# Patient Record
Sex: FEMALE | Race: WHITE | Employment: OTHER | URBAN - METROPOLITAN AREA
[De-identification: names, ages, dates, MRNs, and addresses within clinical notes are randomized per-mention and may not be internally consistent; named-entity substitution may affect disease eponyms.]

---

## 2021-02-17 ENCOUNTER — HOSPITAL ENCOUNTER (OUTPATIENT)
Dept: PHYSICAL THERAPY | Age: 71
Discharge: HOME | End: 2021-02-17
Payer: MEDICARE

## 2021-02-17 PROCEDURE — 97162 PT EVAL MOD COMPLEX 30 MIN: CPT

## 2021-02-17 PROCEDURE — 97110 THERAPEUTIC EXERCISES: CPT

## 2021-02-17 NOTE — PROGRESS NOTES
PHYSICAL THERAPY - DAILY TREATMENT NOTE     Patient Name: Steffany Mckenzie        Date: 2021  : 1950   YES Patient  Verified  Visit #:   1     Insurance: Payor: Fede Keep / Plan: [place] MEDICARE PART A & B / Product Type: Medicare /      In time: 8682 Out time: 135   Total Treatment Time: 50     Medicare/BCBS Time Tracking (below)   Total Timed Codes (min):  20 1:1 Treatment Time:  50     TREATMENT AREA =  Pain in left thigh [M79.652]    SUBJECTIVE    Pain Level (on 0 to 10 scale):  1  / 10   Medication Changes/New allergies or changes in medical history, any new surgeries or procedures?     NO    If yes, update Summary List   Subjective Functional Status/Changes:  []  No changes reported     See eval /POC         OBJECTIVE  Modalities Rationale:     decrease pain to improve patient's ability to return to PLOF      min [] Estim, type/location:                                      []  att     []  unatt     []  w/US     []  w/ice    []  w/heat    min []  Mechanical Traction: type/lbs                   []  pro   []  sup   []  int   []  cont    []  before manual    []  after manual    min []  Ultrasound, settings/location:      min []  Iontophoresis w/ dexamethasone, location:                                               []  take home patch       []  in clinic    min []  Ice     []  Heat    location/position:     min []  Vasopneumatic Device, press/temp:     min []  Other:    [] Skin assessment post-treatment (if applicable):    []  intact    []  redness- no adverse reaction     []redness  adverse reaction:      10 min Therapeutic Exercise:  [x]  See flow sheet   Rationale:      increase ROM and increase strength to improve the patients ability to return to PLOF     10 min Manual Therapy: Technique:      [] S/DTM []IASTM []PROM [] Passive Stretching   []manual TPR    []Jt manipulation:Gr I [] II []  III [] IV[]  []REIL with manual OP  Treatment Area:     Rationale:      decrease pain, increase ROM, increase tissue extensibility and decrease trigger points to improve patient's ability to return to PLOF     min Neuromuscular Re-ed: [x]  See flow sheet   Rationale:      improve coordination, improve balance, increase proprioception and dec dizziness to improve the patients ability to return to PLOF        min Self Care:    Rationale:    increase ROM, increase strength and improve coordination to improve the patients ability to return to PLOF    Billed With/As:   [] TE   [] TA   [] Neuro   [] Self Care Patient Education: [x] Review HEP    [] Progressed/Changed HEP based on:   [] positioning   [] body mechanics   [] transfers   [] heat/ice application    [] other:        Other Objective/Functional Measures:    See eval/ POC     Post Treatment Pain Level (on 0 to 10) scale:   0  / 10     ASSESSMENT    X  See POC     PLAN    [x]  Upgrade activities as tolerated {YES) Continue plan of care   []  Discharge due to :    []  Other:      Therapist: Marcio Espitia PT    Date: 2/17/2021 Time: 1:48 PM     Future Appointments   Date Time Provider Leighton Flynn   2/19/2021  1:15 PM Cruz Decree, PTA [facility]   2/22/2021  2:45 PM Cruz Decree, PTA [facility]   2/26/2021 10:30 AM Cruz Decree, PTA [facility]   3/1/2021  9:30 AM Hank Clap, PT [facility]   3/3/2021  9:30 AM Hank Clap, PT [facility]   3/8/2021  9:30 AM Hank Clap, PT [facility]   3/10/2021  9:30 AM Hank Clap, PT [facility]   3/15/2021  9:30 AM Hank Clap, PT [facility]   3/17/2021 11:45 AM Hank Clap, PT [facility]

## 2021-02-17 NOTE — PROGRESS NOTES
[address] [facility] PHYSICAL THERAPY AT [facility] [address] [place], [address] - Phone: [phone]  Fax: [phone] / [facility]  Patient Name: Kristina Galdamez : 1950   Medical   Diagnosis: Pain in left thigh [M79.652] Treatment Diagnosis: Lx radic   Onset Date: ongoing     Referral Source: Ann Marie Traylor MD Start of Care [facility]): 2021   Prior Hospitalization: See medical history Provider #: [id number]   Prior Level of Function: Pain with ADLs    Comorbidities: Depression, arthritis, thyroid problem, asthma, gall baller surgery, toe surgery   Medications: Verified on Patient Summary List   The Plan of Care and following information is based on the information from the initial evaluation.   ================================================================  Assessment / hoyos information: Kristina Galdamez is a 79 y.o.  yo female with Dx of Pain in left thigh [M79.652]. She reports long history of LBP/ hip pain with recent worsening of symptoms. Pain is constant and across the LB and into the L buttock. Pain is worse in the morning and with activities to include sitting, transitioning from sit to stand, bending and L sidelying. On assessment, Kristina Galdamez demonstrates Lx ROM as follows: flex= 75%, ext= 50%, R ROT= 75%, L ROT= 50% with pain. LE strength and ROM are WNLs. SLR test is negative. Repeated movement tests suggests directional preference for extension. Palpation of pelvic landmarks indicates L anterior innominate rotation.   FOTO score= 54%.  ================================================================  Eval Complexity: History MEDIUM  Complexity : 1-2 comorbidities / personal factors will impact the outcome/ POC ;  Examination  HIGH Complexity : 4+ Standardized tests and measures addressing body structure, function, activity limitation and / or participation in recreation ; Presentation MEDIUM Complexity : Evolving with changing characteristics ; Decision Making MEDIUM Complexity : FOTO score of 26-74; Overall Complexity MEDIUM  Problem List: pain affecting function, decrease ROM, impaired gait/ balance, decrease ADL/ functional abilitiies and decrease activity tolerance   Treatment Plan may include any combination of the following: Therapeutic exercise, Therapeutic activities, Neuromuscular re-education, Physical agent/modality, Manual therapy and Patient education  Patient / Family readiness to learn indicated by: asking questions, trying to perform skills and interest  Persons(s) to be included in education: patient (P)  Barriers to Learning/Limitations: None  Measures taken, if barriers to learning:    Patient Goal (s): Eliminate pain   Patient self reported health status: good  Rehabilitation Potential: good   Short Term Goals: To be accomplished in  2  weeks:  1 Patient will report >= 25% improvement in symptoms with ADLs. 2 Patient will be educated in appropriate ROM, stabilization, strengthening exercises. 3 Patient will be educated in good posture/ body mechanics/ ergonomics for improved ADLs/work activity.  Long Term Goals: To be accomplished in  4-6  weeks:  1 Patient to report >= 70% improvement in symptoms with ADLs. 2 Patient will be independent with finalized HEP/ self maintenance. 3 Increase FOTO score >=  63% to indicate improved function with use of LS. 4 Patient to maintain neutral pelvis x 2 week duration. Frequency / Duration:   Patient to be seen  2  times per week for 4-6  weeks:  Patient / Caregiver education and instruction: self care, activity modification and exercises    Therapist Signature: Nessa Fermin PT Date: 5/33/5089   Certification Period: 2/17/21 to 5/13/21 Time: 1:49 PM   ===================================================================  I certify that the above Physical Therapy Services are being furnished while the patient is under my care. I agree with the treatment plan and certify that this therapy is necessary. Physician Signature:        Date:       Time:     Please sign and return to [facility] or you may fax the signed copy to [phone]. Thank you.

## 2021-02-19 ENCOUNTER — HOSPITAL ENCOUNTER (OUTPATIENT)
Dept: PHYSICAL THERAPY | Age: 71
Discharge: HOME | End: 2021-02-19
Payer: MEDICARE

## 2021-02-19 PROCEDURE — 97530 THERAPEUTIC ACTIVITIES: CPT

## 2021-02-19 PROCEDURE — 97110 THERAPEUTIC EXERCISES: CPT

## 2021-02-19 PROCEDURE — 97140 MANUAL THERAPY 1/> REGIONS: CPT

## 2021-02-19 NOTE — PROGRESS NOTES
PHYSICAL THERAPY - DAILY TREATMENT NOTE    Patient Name: Apurva Recio        Date: 2021  : 1950   YES Patient  Verified  Visit #:     Insurance: Payor: Shannan Members / Plan: [place] MEDICARE PART A & B / Product Type: Medicare /      In time: 1:15 Out time: 2:08    Total Treatment Time: 53     Medicare Time /BCBS Tracking (below)   Total Timed Codes (min):  53 1:1 Treatment Time:  53     TREATMENT AREA =  Pain in left thigh [M79.652]    SUBJECTIVE  Pain Level (on 0 to 10 scale):  0 / 10-   Medication Changes/New allergies or changes in medical history, any new surgeries or procedures? NO    If yes, update Summary List   Subjective Functional Status/Changes:  []  No changes reported     Pt reports she is trying not to bend. Pain when she first gets out of bed for a couple of steps. OBJECTIVE  Modalities Rationale:  Pt deferred     decrease edema, decrease inflammation, decrease pain and increase tissue extensibility to improve patient's ability to perform functional ADLs   min [] Estim, type/location:                                      []  att     []  unatt     []  w/US     []  w/ice    []  w/heat    min []  Mechanical Traction: type/lbs                   []  pro   []  sup   []  int   []  cont    []  before manual    []  after manual    min []  Ultrasound, settings/location:      min []  Iontophoresis w/ dexamethasone, location:                                               []  take home patch       []  in clinic    min []  Ice     []  Heat    location/position:     min []  Vasopneumatic Device, press/temp:     min []  Other:    [] Skin assessment post-treatment (if applicable):    []  intact    []  redness- no adverse reaction     []redness  adverse reaction:        28 min Therapeutic Exercise:  [x]  See flow sheet   Rationale:      increase ROM and increase strength to improve the patients ability to perform prolonged walking     12 min Manual Therapy:   The manual therapy interventions were performed at a separate and distinct time from the therapeutic activities interventions   Corrected right posterior innominate with MET ; prone STM/DTM to lumbar paraspinals   Rationale:      decrease pain, increase ROM, increase tissue extensibility and decrease trigger points to improve patient's ability to improve tissue mobility in prolonged walking, bending    13 min Therapeutic Activity: BET sit to stand body mechanics, log roll supine to sit , golfers bend education   Rationale:    increase ROM, improve coordination and increase proprioception to improve the patients ability to perform functional ADLs      With TA, TE min Patient Education:  YES  Reviewed HEP   []  Progressed/Changed HEP based on: Other Objective/Functional Measures: Add abdominal draw, BKFO, hip ABD/ADD OTB, post hip stretch  Prone trunk AROM ~ 75%    Post Treatment Pain Level (on 0 to 10) scale:   0  / 10     ASSESSMENT  Assessment/Changes in Function:   Pt reporting good pain relief with extension based exercise  Advanced core stabilization exercise . Pt verbalized good understanding of written copy. Corrected R post innominate     []  See Progress Note/Recertification   Patient will continue to benefit from skilled PT services to modify and progress therapeutic interventions, address functional mobility deficits, address ROM deficits, address strength deficits, analyze and address soft tissue restrictions and instruct in home and community integration to attain remaining goals.    Progress toward goals / Updated goals:    Functional improvements: good pain relief with current exercise program     PLAN  []  Upgrade activities as tolerated YES Continue plan of care   []  Discharge due to :    []  Other:      Therapist: Vanessa Griffith PTA    Date: 2/19/2021 Time: 2:08 PM     Future Appointments   Date Time Provider Leighton Flynn   2/22/2021  2:45 PM Felicia Alvarado PTA [facility]   2/26/2021 10:30 AM Estefany Canada, PTA [facility]   3/1/2021  9:30 AM Aileen Karma, PT [facility]   3/3/2021  9:30 AM Aileen Karma, PT [facility]   3/8/2021  9:30 AM Aileen Karma, PT [facility]   3/10/2021  9:30 AM Aileen Karma, PT [facility]   3/15/2021  9:30 AM Aileen Karma, PT [facility]   3/17/2021 11:45 AM Aileen Karma, PT [facility]

## 2021-02-22 ENCOUNTER — HOSPITAL ENCOUNTER (OUTPATIENT)
Dept: PHYSICAL THERAPY | Age: 71
Discharge: HOME | End: 2021-02-22
Payer: MEDICARE

## 2021-02-22 PROCEDURE — 97530 THERAPEUTIC ACTIVITIES: CPT

## 2021-02-22 PROCEDURE — 97140 MANUAL THERAPY 1/> REGIONS: CPT

## 2021-02-22 PROCEDURE — 97110 THERAPEUTIC EXERCISES: CPT

## 2021-02-22 NOTE — PROGRESS NOTES
PHYSICAL THERAPY - DAILY TREATMENT NOTE    Patient Name: Cindi Arora        Date: 2021  : 1950   YES Patient  Verified  Visit #:  3   of   12  Insurance: Payor: May Gaitan / Plan: [place] MEDICARE PART A & B / Product Type: Medicare /      In time: 2:55  Out time: 3:43    Total Treatment Time: 48     Medicare Time /BCBS Tracking (below)   Total Timed Codes (min): 38 1:1 Treatment Time:  38     TREATMENT AREA =  Pain in left thigh [M79.652]    SUBJECTIVE  Pain Level (on 0 to 10 scale):  0 / 50-[id number] with certain movements and right side bend, RR   Medication Changes/New allergies or changes in medical history, any new surgeries or procedures? NO    If yes, update Summary List   Subjective Functional Status/Changes:  []  No changes reported     Pt reports sore the next day. I walked today and I had to stop because it was uncomfortable.      OBJECTIVE  Modalities Rationale:  Pt deferred     decrease edema, decrease inflammation, decrease pain and increase tissue extensibility to improve patient's ability to perform functional ADLs   min [] Estim, type/location:                                      []  att     []  unatt     []  w/US     []  w/ice    []  w/heat    min []  Mechanical Traction: type/lbs                   []  pro   []  sup   []  int   []  cont    []  before manual    []  after manual    min []  Ultrasound, settings/location:      min []  Iontophoresis w/ dexamethasone, location:                                               []  take home patch       []  in clinic   10 min [x]  Ice     []  Heat    location/position: Prone low back    min []  Vasopneumatic Device, press/temp:     min []  Other:    [x] Skin assessment post-treatment (if applicable):    [x]  intact    []  redness- no adverse reaction     []redness  adverse reaction:        16 min Therapeutic Exercise:  [x]  See flow sheet   Rationale:      increase ROM and increase strength to improve the patients ability to perform prolonged walking     13 min Manual Therapy: The manual therapy interventions were performed at a separate and distinct time from the therapeutic activities interventions   Corrected right posterior innominate with MET ; prone STM/DTM to lumbar paraspinals   Rationale:      decrease pain, increase ROM, increase tissue extensibility and decrease trigger points to improve patient's ability to improve tissue mobility in prolonged walking, bending    9 min Therapeutic Activity: BET sit to stand body mechanics, golfers bend,  supine to sit    Rationale:    increase ROM, improve coordination and increase proprioception to improve the patients ability to perform functional ADLs      With TA, TE min Patient Education:  YES  Reviewed HEP   []  Progressed/Changed HEP based on: Other Objective/Functional Measures:  Trunk AROM: FF: 90% , ext: 50%- pain left LB, RSB: 80%-pull left LB   LSB: 50%-pull L LB, RR: 90%; LR: 85% -pull L LB    Add march with core in supine, SB 1 10x 5 second hold   Post Treatment Pain Level (on 0 to 10) scale:   0  / 10     ASSESSMENT  Assessment/Changes in Function:   SI hypomobility noted on R posterior innominate. Corrected with MET; pt instruction in self correction technique. Review body mechanics in sitting, bending, reaching. Pt reporting good sx relief with extension based exercise. Modified post hip stretch on left  With push versus pull secondary to pinching in left groin region. []  See Progress Note/Recertification   Patient will continue to benefit from skilled PT services to modify and progress therapeutic interventions, address functional mobility deficits, address ROM deficits, address strength deficits, analyze and address soft tissue restrictions and instruct in home and community integration to attain remaining goals.    Progress toward goals / Updated goals:    Functional improvements: good pain relief with current exercise program     PLAN  []  Upgrade activities as tolerated YES Continue plan of care   []  Discharge due to :    []  Other:      Therapist: Rica Duran PTA    Date: 2/22/2021 Time: 3:43  PM     Future Appointments   Date Time Provider Leighton Flynn   2/26/2021 10:30 AM Mae Guthrie PTA [facility]   3/1/2021  9:30 AM De Floor, PT [facility]   3/3/2021  9:30 AM De Floor, PT [facility]   3/8/2021  9:30 AM De Floor, PT [facility]   3/10/2021  9:30 AM De Floor, PT [facility]   3/15/2021  9:30 AM De Floor, PT [facility]   3/17/2021 11:45 AM De Floor, PT [facility]

## 2021-02-26 ENCOUNTER — HOSPITAL ENCOUNTER (OUTPATIENT)
Dept: PHYSICAL THERAPY | Age: 71
Discharge: HOME | End: 2021-02-26
Payer: MEDICARE

## 2021-02-26 PROCEDURE — 97140 MANUAL THERAPY 1/> REGIONS: CPT

## 2021-02-26 PROCEDURE — 97110 THERAPEUTIC EXERCISES: CPT

## 2021-02-26 NOTE — PROGRESS NOTES
PHYSICAL THERAPY - DAILY TREATMENT NOTE    Patient Name: Britt Plolock        Date: 2021  : 1950   YES Patient  Verified  Visit #:   Insurance: Payor: Gurdeep Gomez / Plan: [place] MEDICARE PART A & B / Product Type: Medicare /      In time: 10:35 Out time: 11:23    Total Treatment Time: 64     Medicare Time /BCBS Tracking (below)   Total Timed Codes (min): 54 1:1 Treatment Time:  39     TREATMENT AREA =  Pain in left thigh [M79.652]    SUBJECTIVE  Pain Level (on 0 to 10 scale):  0 / 49-[id number] with certain movements and right side bend, RR   Medication Changes/New allergies or changes in medical history, any new surgeries or procedures? yes    If yes, update Summary List   Subjective Functional Status/Changes:  []  No changes reported     Pt aQ ortho . Nothing wrong bone wise.  Follow up in 3 weeks     OBJECTIVE  Modalities Rationale:       decrease edema, decrease inflammation, decrease pain and increase tissue extensibility to improve patient's ability to perform functional ADLs   min [] Estim, type/location:                                      []  att     []  unatt     []  w/US     []  w/ice    []  w/heat    min []  Mechanical Traction: type/lbs                   []  pro   []  sup   []  int   []  cont    []  before manual    []  after manual    min []  Ultrasound, settings/location:      min []  Iontophoresis w/ dexamethasone, location:                                               []  take home patch       []  in clinic   10 min []  Ice     [x]  Heat    location/position: Prone low back over 1 pillow    min []  Vasopneumatic Device, press/temp:     min []  Other:    [x] Skin assessment post-treatment (if applicable):    [x]  intact    []  redness- no adverse reaction     []redness  adverse reaction:        40 min Therapeutic Exercise:  [x]  See flow sheet   Rationale:      increase ROM and increase strength to improve the patients ability to perform prolonged walking     12 min Manual Therapy: The manual therapy interventions were performed at a separate and distinct time from the therapeutic activities interventions   Corrected right anterior innominate & Right inflare with MET ; prone STM/DTM to lumbar paraspinals and glut medius   Rationale:      decrease pain, increase ROM, increase tissue extensibility and decrease trigger points to improve patient's ability to improve tissue mobility in prolonged walking, bending    2 min Therapeutic Activity: Body mechanics in vacuuming and bending   Rationale:    increase ROM, improve coordination and increase proprioception to improve the patients ability to perform functional ADLs      With TA, TE min Patient Education:  YES  Reviewed HEP   []  Progressed/Changed HEP based on: Other Objective/Functional Measures:  Prone AROM trunk extension ~ 30%  Add supine Bridge   Post Treatment Pain Level (on 0 to 10) scale:   0  / 10     ASSESSMENT  Assessment/Changes in Function:   Pt now able to perform posterior hip stretch  With pull bilaterally-indicating improving hip ROM  Pain centralizing to low back   Add MH in prone over 1 pillow     []  See Progress Note/Recertification   Patient will continue to benefit from skilled PT services to modify and progress therapeutic interventions, address functional mobility deficits, address ROM deficits, address strength deficits, analyze and address soft tissue restrictions and instruct in home and community integration to attain remaining goals.    Progress toward goals / Updated goals:    Functional improvements: improving tolerance to ex, hip ROM     PLAN  []  Upgrade activities as tolerated YES Continue plan of care   []  Discharge due to :    []  Other:      Therapist: Emmy Plascencia PTA    Date: 2/26/2021 Time:   11:23      Future Appointments   Date Time Provider Leighton Flynn   3/1/2021  9:30 AM Paul Brooks PT [facility]   3/3/2021  8:45 AM Paul Brooks PT [facility]   3/8/2021  9:30 AM Sav Foote, PT [facility]   3/10/2021  9:30 AM Sav Foote, PT [facility]   3/15/2021  9:30 AM Sav Foote, PT [facility]   3/17/2021 11:45 AM Sav Foote, PT [facility]

## 2021-03-01 ENCOUNTER — HOSPITAL ENCOUNTER (OUTPATIENT)
Dept: PHYSICAL THERAPY | Age: 71
Discharge: HOME | End: 2021-03-01
Payer: MEDICARE

## 2021-03-01 PROCEDURE — 97140 MANUAL THERAPY 1/> REGIONS: CPT

## 2021-03-01 PROCEDURE — 97110 THERAPEUTIC EXERCISES: CPT

## 2021-03-01 NOTE — PROGRESS NOTES
PHYSICAL THERAPY - DAILY TREATMENT NOTE     Patient Name: Rueben Severin        Date: 3/1/2021  : 1950   YES Patient  Verified  Visit #:     Insurance: Payor: Laylahang Baugh / Plan: [place] MED[place] PART [place] & B / Product Type: Medicare /      In time: 692 Out time: 9635   Total Treatment Time: 50     Medicare/BCBS Time Tracking (below)   Total Timed Codes (min):  40 1:1 Treatment Time:  40     TREATMENT AREA =  Pain in left thigh [M79.652]    SUBJECTIVE    Pain Level (on 0 to 10 scale):    / 10 (2/10 with movement, 6/10 getting out of bed)   Medication Changes/New allergies or changes in medical history, any new surgeries or procedures?     NO    If yes, update Summary List   Subjective Functional Status/Changes:  []  No changes reported   This is the first time I've have pain, but cortisone dosage down to 2 pills    Pain right at L SIJ region           OBJECTIVE  Modalities Rationale:     decrease pain and increase tissue extensibility to improve patient's ability to relax      min [] Estim, type/location:                                      []  att     []  unatt     []  w/US     []  w/ice    []  w/heat    min []  Mechanical Traction: type/lbs                   []  pro   []  sup   []  int   []  cont    []  before manual    []  after manual    min []  Ultrasound, settings/location:      min []  Iontophoresis w/ dexamethasone, location:                                               []  take home patch       []  in clinic   10 min []  Ice     [x]  Heat    location/position:     min []  Vasopneumatic Device, press/temp:     min []  Other:    [x] Skin assessment post-treatment (if applicable):    [x]  intact    []  redness- no adverse reaction     []redness  adverse reaction:      15 min Manual Therapy: Technique:      [x] S/DTM []IASTM []PROM [] Passive Stretching   [x]manual TPR  [] SOR [] man traction  []Jt manipulation:Gr I [] II []  III [] IV[]   [] OP with REIL    [x] MET  Treatment Area:LB/ SIJ *manual therapy interventions were performed at a separate and distinct time from   the therapeutic activities interventions. Rationale:      decrease pain, increase ROM, increase tissue extensibility, decrease trigger points and increase postural awareness to improve patient's ability to perform ADLs with less pain    25 min Therapeutic Exercise:  [x]  See flow sheet   Rationale:      increase ROM, increase strength and improve symmetry to improve the patients ability to perform ADLs         Billed With/As:   [x] TE   [] TA   [] Neuro   [] Self Care Patient Education: [x] Review HEP    [] Progressed/Changed HEP based on:   [] positioning   [] body mechanics   [] transfers   [] heat/ice application    [] other:        Other Objective/Functional Measures:    L ant/ R post innominate   Post Treatment Pain Level (on 0 to 10) scale:   0-1  / 10     ASSESSMENT  Assessment/Changes in Function:      Functional improvement:  *progressing  Core stability  Functional limitation:  SIJ pain with activity           Patient will continue to benefit from skilled PT services to modify and progress therapeutic interventions, address functional mobility deficits, address ROM deficits, address strength deficits, analyze and address soft tissue restrictions, analyze and cue movement patterns and assess and modify postural abnormalities to attain remaining goals.    Progress toward goals / Updated goals:    Slow Progress to    [] STG    [x] LTG  4 as shown by pelvic assymetry         []  See Progress Note/Recertification    PLAN    [x]  Upgrade activities as tolerated {YES) Continue plan of care   []  Discharge due to :    []  Other:      Therapist: Joao Maria PT    Date: 3/1/2021 Time: 9:36 AM     Future Appointments   Date Time Provider Leightno Flynn   3/3/2021  8:45 AM Sandra Crowley PT [facility]   3/8/2021  9:30 AM Sandra Crowley PT [facility]   3/10/2021  9:30 AM Sandra Crowley PT [facility]   3/15/2021 9:30 AM Gunnar Bell, PT [facility]   3/17/2021 11:45 AM Gunnar Bell PT [facility]

## 2021-03-03 ENCOUNTER — HOSPITAL ENCOUNTER (OUTPATIENT)
Dept: PHYSICAL THERAPY | Age: 71
Discharge: HOME | End: 2021-03-03
Payer: MEDICARE

## 2021-03-03 PROCEDURE — 97110 THERAPEUTIC EXERCISES: CPT

## 2021-03-03 PROCEDURE — 97140 MANUAL THERAPY 1/> REGIONS: CPT

## 2021-03-03 NOTE — PROGRESS NOTES
PHYSICAL THERAPY - DAILY TREATMENT NOTE     Patient Name: Greg Stone        Date: 3/3/2021  : 1950   YES Patient  Verified  Visit #:     Insurance: Payor: Elvia Gonzalez / Plan: [place] MEDICARE PART A & B / Product Type: Medicare /      In time: 005 Out time: 940   Total Treatment Time: 55     Medicare/BCBS Time Tracking (below)   Total Timed Codes (min):  45 1:1 Treatment Time:  45     TREATMENT AREA =  Pain in left thigh [M79.972]    SUBJECTIVE    Pain Level (on 0 to 10 scale):  4  / 10   Medication Changes/New allergies or changes in medical history, any new surgeries or procedures? NO    If yes, update Summary List   Subjective Functional Status/Changes:  []  No changes reported   Always notice it getting out of car, getting out of a chair.   Worse in the morning           OBJECTIVE  Modalities Rationale:     decrease pain and increase tissue extensibility to improve patient's ability to perform ADLs with less pain      min [] Estim, type/location:                                      []  att     []  unatt     []  w/US     []  w/ice    []  w/heat    min []  Mechanical Traction: type/lbs                   []  pro   []  sup   []  int   []  cont    []  before manual    []  after manual    min []  Ultrasound, settings/location:      min []  Iontophoresis w/ dexamethasone, location:                                               []  take home patch       []  in clinic   10 min []  Ice     [x]  Heat    location/position:     min []  Vasopneumatic Device, press/temp:     min []  Other:    [x] Skin assessment post-treatment (if applicable):    [x]  intact    [x]  redness- no adverse reaction     []redness  adverse reaction:      20 min Manual Therapy: Technique:      [x] S/DTM []IASTM []PROM [] Passive Stretching   [x]manual TPR  [] SOR [] man traction  []Jt manipulation:Gr I [] II []  III [] IV[]   [] OP with REIL    [x] MET  Treatment Area: pelvis/ LB       *manual therapy interventions were performed at a separate and distinct time from   the therapeutic activities interventions. Rationale:      decrease pain, increase ROM, increase tissue extensibility and decrease trigger points to improve patient's ability to perform ADLs    25 min Therapeutic Exercise:  [x]  See flow sheet   Rationale:      increase ROM, increase strength and inc stability to improve the patients ability to perform ADLs with less pain       Billed With/As:   [] TE   [] TA   [] Neuro   [] Self Care Patient Education: [x] Review HEP    [] Progressed/Changed HEP based on:   [] positioning   [] body mechanics   [] transfers   [] heat/ice application    [] other:        Other Objective/Functional Measures:    Tender L SIJ  Mild L ant innominate- corrected with MET   Post Treatment Pain Level (on 0 to 10) scale:   0  / 10     ASSESSMENT  Assessment/Changes in Function:      Min change           Patient will continue to benefit from skilled PT services to modify and progress therapeutic interventions, address functional mobility deficits, address ROM deficits, address strength deficits, analyze and address soft tissue restrictions, analyze and cue movement patterns and assess and modify postural abnormalities to attain remaining goals.    Progress toward goals / Updated goals:    Slow Progress to    [x] STG    [x] LTG  1 as shown by NPI         []  See Progress Note/Recertification    PLAN    [x]  Upgrade activities as tolerated {YES) Continue plan of care   []  Discharge due to :    []  Other:      Therapist: Anna Oppenheim, PT    Date: 3/3/2021 Time: 8:46 AM     Future Appointments   Date Time Provider Leighton Flynn   3/8/2021  9:30 AM Alexa Montero PT [facility]   3/10/2021  9:30 AM Alexa Montero PT [facility]   3/15/2021  9:30 AM Kelly Morrison PTA [facility]   3/17/2021 11:45 AM Alexa Montero PT [facility]

## 2021-03-08 ENCOUNTER — HOSPITAL ENCOUNTER (OUTPATIENT)
Dept: PHYSICAL THERAPY | Age: 71
Discharge: HOME | End: 2021-03-08
Payer: MEDICARE

## 2021-03-08 PROCEDURE — 97140 MANUAL THERAPY 1/> REGIONS: CPT

## 2021-03-08 PROCEDURE — 97110 THERAPEUTIC EXERCISES: CPT

## 2021-03-08 NOTE — PROGRESS NOTES
PHYSICAL THERAPY - DAILY TREATMENT NOTE     Patient Name: Nneka Osorio        Date: 3/8/2021  : 1950   YES Patient  Verified  Visit #:     Insurance: Payor: Brigitte Hazard / Plan: [place] MEDICARE PART A & B / Product Type: Medicare /      In time: 930 Out time: 1020   Total Treatment Time: 50     Medicare/BCBS Time Tracking (below)   Total Timed Codes (min):  40 1:1 Treatment Time:  40     TREATMENT AREA =  Pain in left thigh [M79.372]    SUBJECTIVE    Pain Level (on 0 to 10 scale):  0  / 10   Medication Changes/New allergies or changes in medical history, any new surgeries or procedures? NO    If yes, update Summary List   Subjective Functional Status/Changes:  []  No changes reported   Stiff after last visit,     More LBP than hip pain. Getting better slowly.   Worse inmorning           OBJECTIVE  Modalities Rationale:     decrease pain and increase tissue extensibility to improve patient's ability to relax      min [] Estim, type/location:                                      []  att     []  unatt     []  w/US     []  w/ice    []  w/heat    min []  Mechanical Traction: type/lbs                   []  pro   []  sup   []  int   []  cont    []  before manual    []  after manual    min []  Ultrasound, settings/location:      min []  Iontophoresis w/ dexamethasone, location:                                               []  take home patch       []  in clinic   10 min []  Ice     [x]  Heat    location/position:     min []  Vasopneumatic Device, press/temp:     min []  Other:    [x] Skin assessment post-treatment (if applicable):    [x]  intact    [x]  redness- no adverse reaction     []redness  adverse reaction:      10 min Manual Therapy: Technique:      [x] S/DTM []IASTM []PROM [] Passive Stretching   [x]manual TPR  [] SOR [] man traction  []Jt manipulation:Gr I [] II []  III [] IV[]   [] OP with REIL    []   Treatment Area:  LB      *manual therapy interventions were performed at a separate and distinct time from   the therapeutic activities interventions. Rationale:      decrease pain, increase ROM, increase tissue extensibility and decrease trigger points to improve patient's ability to perform ADLs with less pain    30 min Therapeutic Exercise:  [x]  See flow sheet   Rationale:      increase ROM, increase strength and dec neural compromise with less pain to improve the patients ability to perform ADLs with less pain       Billed With/As:   [] TE   [] TA   [] Neuro   [] Self Care Patient Education: [x] Review HEP    [] Progressed/Changed HEP based on:   [] positioning   [] body mechanics   [] transfers   [] heat/ice application    [] other:        Other Objective/Functional Measures:    Pelvic landmarks =  Progressed dead bug, added PKE   Post Treatment Pain Level (on 0 to 10) scale:   0  / 10     ASSESSMENT  Assessment/Changes in Function:      Functional improvement:  centralization of Sx  Functional limitation:  morning pain           Patient will continue to benefit from skilled PT services to modify and progress therapeutic interventions, address functional mobility deficits, address ROM deficits, address strength deficits, analyze and address soft tissue restrictions and analyze and cue movement patterns to attain remaining goals.    Progress toward goals / Updated goals:    Fair Progress to    [] STG    [x] LTG  1 as shown by pt report         []  See Progress Note/Recertification    PLAN    [x]  Upgrade activities as tolerated {YES) Continue plan of care   []  Discharge due to :    []  Other:      Therapist: Kenji Morelos PT    Date: 3/8/2021 Time: 9:32 AM     Future Appointments   Date Time Provider Leighton Flynn   3/10/2021  9:30 AM Srikanth Sylvester PT [facility]   3/15/2021  9:30 AM Delmy Uriaselor, PTA [facility]   3/17/2021 11:45 AM Srikanth Sylvester PT [facility]

## 2021-03-10 ENCOUNTER — HOSPITAL ENCOUNTER (OUTPATIENT)
Dept: PHYSICAL THERAPY | Age: 71
Discharge: HOME | End: 2021-03-10
Payer: MEDICARE

## 2021-03-10 PROCEDURE — 97140 MANUAL THERAPY 1/> REGIONS: CPT

## 2021-03-10 PROCEDURE — 97110 THERAPEUTIC EXERCISES: CPT

## 2021-03-10 NOTE — PROGRESS NOTES
PHYSICAL THERAPY - DAILY TREATMENT NOTE     Patient Name: Tamanna Ferrell        Date: 3/10/2021  : 1950   YES Patient  Verified  Visit #:     Insurance: Payor: [facility] / Plan: [place] MEDICARE PART A & B / Product Type: Medicare /      In time: 930 Out time: 1020   Total Treatment Time: 50     Medicare/BCBS Time Tracking (below)   Total Timed Codes (min):  40 1:1 Treatment Time:  40     TREATMENT AREA =  Pain in left thigh [M79.702]    SUBJECTIVE    Pain Level (on 0 to 10 scale):  1-2  / 10   Medication Changes/New allergies or changes in medical history, any new surgeries or procedures? NO    If yes, update Summary List   Subjective Functional Status/Changes:  []  No changes reported   I did something to my back last night in bed.   L LBP           OBJECTIVE  Modalities Rationale:     decrease inflammation and decrease pain to improve patient's ability to perform ADLs with less pain      min [] Estim, type/location:                                      []  att     []  unatt     []  w/US     []  w/ice    []  w/heat    min []  Mechanical Traction: type/lbs                   []  pro   []  sup   []  int   []  cont    []  before manual    []  after manual    min []  Ultrasound, settings/location:      min []  Iontophoresis w/ dexamethasone, location:                                               []  take home patch       []  in clinic   10 min [x]  Ice     []  Heat    location/position:     min []  Vasopneumatic Device, press/temp:     min []  Other:    [x] Skin assessment post-treatment (if applicable):    [x]  intact    []  redness- no adverse reaction     []redness  adverse reaction:      15 min Manual Therapy: Technique:      [x] S/DTM []IASTM []PROM [] Passive Stretching   [x]manual TPR  [] SOR [] man traction  []Jt manipulation:Gr I [] II []  III [] IV[]   [] OP with REIL    [x] MET  Treatment Area:  LB/ pelvis      *manual therapy interventions were performed at a separate and distinct time from   the therapeutic activities interventions. Rationale:      decrease pain, increase ROM, increase tissue extensibility, decrease trigger points and increase postural awareness to improve patient's ability to perform ADLs with less pain    25 min Therapeutic Exercise:  [x]  See flow sheet   Rationale:      increase ROM, increase strength and inc stability to improve the patients ability to perform ADLs with less pain      x min Self Care: Self correction MET instruction   Rationale:    improve symmetry to improve the patients ability to perform ADLs with less pain    Billed With/As:   [] TE   [] TA   [] Neuro   [] Self Care Patient Education: [x] Review HEP    [] Progressed/Changed HEP based on:   [] positioning   [] body mechanics   [] transfers   [] heat/ice application    [] other:        Other Objective/Functional Measures:    L ant/ R post innominate--corrected with MET   Post Treatment Pain Level (on 0 to 10) scale:   1  / 10     ASSESSMENT  Assessment/Changes in Function:      Progressing core stability- able to advance there ex           Patient will continue to benefit from skilled PT services to modify and progress therapeutic interventions, address functional mobility deficits, address ROM deficits, address strength deficits, analyze and address soft tissue restrictions and analyze and cue movement patterns to attain remaining goals.    Progress toward goals / Updated goals:    Able to correct symmetry of pelvis- difficulty maintaining         []  See Progress Note/Recertification    PLAN    [x]  Upgrade activities as tolerated {YES) Continue plan of care   []  Discharge due to :    []  Other:      Therapist: Jeannette Perez PT    Date: 3/10/2021 Time: 9:32 AM     Future Appointments   Date Time Provider Leighton Flynn   3/15/2021  9:30 AM Renny Odom PTA [facility]   3/17/2021 11:45 AM Emmy Paniagua, PT [facility]

## 2021-03-15 ENCOUNTER — HOSPITAL ENCOUNTER (OUTPATIENT)
Dept: PHYSICAL THERAPY | Age: 71
Discharge: HOME | End: 2021-03-15
Payer: MEDICARE

## 2021-03-15 PROCEDURE — 97140 MANUAL THERAPY 1/> REGIONS: CPT

## 2021-03-15 PROCEDURE — 97110 THERAPEUTIC EXERCISES: CPT

## 2021-03-15 NOTE — PROGRESS NOTES
PHYSICAL THERAPY - DAILY TREATMENT NOTE     Patient Name: Steffany Mckenzie        Date: 3/15/2021  : 1950   YES Patient  Verified  Visit #:     Insurance: Payor: Fede Keep / Plan: [place] MEDICARE PART A & B / Product Type: Medicare /      In time: 9:35 Out time: 10:30   Total Treatment Time: 55     Medicare/BCBS Time Tracking (below)   Total Timed Codes (min):  45 1:1 Treatment Time:  45     TREATMENT AREA =  Pain in left thigh [M79.122]    SUBJECTIVE    Pain Level (on 0 to 10 scale):  1-2 / 10   Medication Changes/New allergies or changes in medical history, any new surgeries or procedures? NO    If yes, update Summary List   Subjective Functional Status/Changes:  []  No changes reported     Functional Limitations: LB pain in the morning, discomfort throughout the day. T/S pain with prolonged standing (ie.  Folding clothes)   Functional Improvements: N/A           OBJECTIVE  Modalities Rationale:     decrease inflammation and decrease pain to improve patient's ability to perform ADLs with less pain      min [] Estim, type/location:                                      []  att     []  unatt     []  w/US     []  w/ice    []  w/heat    min []  Mechanical Traction: type/lbs                   []  pro   []  sup   []  int   []  cont    []  before manual    []  after manual    min []  Ultrasound, settings/location:      min []  Iontophoresis w/ dexamethasone, location:                                               []  take home patch       []  in clinic   10 min []  Ice     [x]  Heat    location/position: To L/S and T/S in prone    min []  Vasopneumatic Device, press/temp:     min []  Other:    [x] Skin assessment post-treatment (if applicable):    [x]  intact    []  redness- no adverse reaction     []redness  adverse reaction:      15 min Manual Therapy: Technique:      [x] S/DTM []IASTM []PROM [] Passive Stretching   [x]manual TPR  [] SOR [] man traction  Treatment Area: T/S and L/S  *manual therapy interventions were performed at a separate and distinct time from  the therapeutic activities interventions. Rationale:      decrease pain, increase ROM, increase tissue extensibility, decrease trigger points and increase postural awareness to improve patient's ability to perform ADLs with less pain    30 min Therapeutic Exercise:  [x]  See flow sheet   Rationale:      increase ROM, increase strength and inc stability to improve the patients ability to perform ADLs with less pain    Billed With/As:   [] TE   [] TA   [] Neuro   [] Self Care Patient Education: [x] Review HEP    [] Progressed/Changed HEP based on:   [] positioning   [] body mechanics   [] transfers   [] heat/ice application    [] other:        Other Objective/Functional Measures:    No pelvic obliquity noted today. Maintained throughout Tx. Post Treatment Pain Level (on 0 to 10) scale:   0  / 10     ASSESSMENT  Assessment/Changes in Function:      Progressing core stability but L SIJt pain remains unchanged. Patient will continue to benefit from skilled PT services to modify and progress therapeutic interventions, address functional mobility deficits, address ROM deficits, address strength deficits, analyze and address soft tissue restrictions and analyze and cue movement patterns to attain remaining goals. Progress toward goals / Updated goals:    Slow to min progress towards goals.  PN needed NV.          []  See Progress Note/Recertification    PLAN    [x]  Upgrade activities as tolerated {YES) Continue plan of care   []  Discharge due to :    []  Other:      Therapist: Efren Treadwell PTA    Date: 3/15/2021 Time: 9:32 AM     Future Appointments   Date Time Provider Leighton Flynn   3/15/2021  9:30 AM Christal Herrmann PTA [facility]   3/17/2021 11:45 AM Gunnar Bell PT [facility]

## 2021-03-17 ENCOUNTER — HOSPITAL ENCOUNTER (OUTPATIENT)
Dept: PHYSICAL THERAPY | Age: 71
Discharge: HOME | End: 2021-03-17
Payer: MEDICARE

## 2021-03-17 PROCEDURE — 97110 THERAPEUTIC EXERCISES: CPT

## 2021-03-17 PROCEDURE — 97140 MANUAL THERAPY 1/> REGIONS: CPT

## 2021-03-17 NOTE — PROGRESS NOTES
[address] [facility] PHYSICAL THERAPY AT [address] [place] [address], [address] [place] [address], [address], [address] [place] [address] - Phone: [phone]  Fax: [phone]  Li          Patient Name: Gini Bolton : 1950   Treatment/Medical Diagnosis: Pain in left thigh [M79.652]   Onset Date: ongoing    Referral Source: Nohemi Jean MD [facility]): 21   Prior Hospitalization: See Medical History Provider #: [id number]   Prior Level of Function: Pain with ADLs   Comorbidities: Depression, arthritis, thyroid problem, asthma, gall baller surgery, toe surgery   Medications: Verified on Patient Summary List   Visits from [facility]: 10 Missed Visits: -     Previous Goals:  1 Patient to report >= 70% improvement in symptoms with ADLs. 2 Patient will be independent with finalized HEP/ self maintenance. 3 Increase FOTO score >=  63% to indicate improved function with use of LS. 4 Patient to maintain neutral pelvis x 2 week duration. Prior Level/Current Level:  1) Prior Level: na   Current Level: 25%   Goal Met? progressing  2) Prior Level: na   Current Level: indep with current HEP   Goal Met? Partially met  3) Prior Level: 54%   Current Level: 63%   Goal Met? yes  4) Prior Level: L anterior innominate   Current Level: neutral pelvis last few visits (1 week)   Goal Met? Partially met    Key Functional Changes/Progress: Patient reports 25% overall improvement. She reports decreased frequency and severity of episodes of pain. She reports/ demonstrates improved ability to transition sit to stand and bending.     Problem List: pain affecting function, decrease ROM, decrease strength, decrease ADL/ functional abilitiies and decrease activity tolerance   Treatment Plan may include any combination of the following: Therapeutic exercise, Therapeutic activities, Neuromuscular re-education, Physical agent/modality, Manual therapy and Patient education  Patient Goal(s) has been updated and includes:      Goals for this certification period include and are to be achieved in   4  weeks:  Continue LTGs as stated above with following changes:  3 Increase FOTO score >=  66% to indicate improved function with use of LS. Frequency / Duration:   Patient to be seen   2   times per week for   4    weeks:    Assessments/Recommendations: continue PT per current POC  If you have any questions/comments please contact us directly at [phone]. Thank you for allowing us to assist in the care of your patient. Therapist Signature: Fox Cazares PT Date: 3/48/0116   Certification Period:  Reporting Period: 3/17/21 to 6/14/21 2/17/21 to 3/17/21 Time: 7:40 AM   NOTE TO PHYSICIAN:  PLEASE COMPLETE THE ORDERS BELOW AND FAX TO   [facility] at [address]: [phone]. If you are unable to process this request in 24 hours please contact our office: [phone].    ___ I have read the above report and request that my patient continue as recommended.   ___ I have read the above report and request that my patient continue therapy with the following changes/special instructions: ________________________________________________   ___ I have read the above report and request that my patient be discharged from therapy.      Physician Signature:        Date:       Time:    Sabrina Doyle MD.

## 2021-03-17 NOTE — PROGRESS NOTES
PHYSICAL THERAPY - DAILY TREATMENT NOTE     Patient Name: Berle Mcardle        Date: 3/17/2021  : 1950   YES Patient  Verified  Visit #:   10   of   12  Insurance: Payor: Anh Garlandufman / Plan: [place] / Product Type: Medicare /      In time: 7641 Out time: 6   Total Treatment Time: 50     Medicare/BCBS Time Tracking (below)   Total Timed Codes (min):  40 1:1 Treatment Time:  40     TREATMENT AREA =  Pain in left thigh [M79.652]    SUBJECTIVE    Pain Level (on 0 to 10 scale):  2  / 10   Medication Changes/New allergies or changes in medical history, any new surgeries or procedures? NO    If yes, update Summary List   Subjective Functional Status/Changes:  []  No changes reported   Se PN           OBJECTIVE  Modalities Rationale:     decrease pain and increase tissue extensibility to improve patient's ability to relax      min [] Estim, type/location:                                      []  att     []  unatt     []  w/US     []  w/ice    []  w/heat    min []  Mechanical Traction: type/lbs                   []  pro   []  sup   []  int   []  cont    []  before manual    []  after manual    min []  Ultrasound, settings/location:      min []  Iontophoresis w/ dexamethasone, location:                                               []  take home patch       []  in clinic   10 min []  Ice     [x]  Heat    location/position:     min []  Vasopneumatic Device, press/temp:     min []  Other:    [x] Skin assessment post-treatment (if applicable):    [x]  intact    [x]  redness- no adverse reaction     []redness  adverse reaction:      10 min Manual Therapy: Technique:      [] S/DTM []IASTM []PROM [] Passive Stretching   []manual TPR  [] SOR [] man traction  []Jt manipulation:Gr I [] II []  III [] IV[]   [] OP with REIL    []   Treatment Area:  LB      *manual therapy interventions were performed at a separate and distinct time from   the therapeutic activities interventions.    Rationale: decrease pain, increase ROM, increase tissue extensibility and decrease trigger points to improve patient's ability to perform ADLs with less pain    30 min Therapeutic Exercise:  [x]  See flow sheet   Rationale:      increase ROM, increase strength and improve stability to improve the patients ability to perform ADLs with less pain         Billed With/As:   [] TE   [] TA   [] Neuro   [] Self Care Patient Education: [x] Review HEP    [] Progressed/Changed HEP based on:   [] positioning   [] body mechanics   [] transfers   [] heat/ice application    [] other:        Other Objective/Functional Measures:    See PN   Post Treatment Pain Level (on 0 to 10) scale:   2  / 10     ASSESSMENT    [x]  See Progress Note/Recertification    PLAN    [x]  Upgrade activities as tolerated {YES) Continue plan of care   []  Discharge due to :    []  Other:      Therapist: Roxie Blanco PT    Date: 3/17/2021 Time: 11:54 AM   No future appointments.

## 2021-03-24 ENCOUNTER — HOSPITAL ENCOUNTER (OUTPATIENT)
Dept: PHYSICAL THERAPY | Age: 71
Discharge: HOME | End: 2021-03-24
Payer: MEDICARE

## 2021-03-24 PROCEDURE — 97140 MANUAL THERAPY 1/> REGIONS: CPT

## 2021-03-24 PROCEDURE — 97110 THERAPEUTIC EXERCISES: CPT

## 2021-03-24 NOTE — PROGRESS NOTES
PHYSICAL THERAPY - DAILY TREATMENT NOTE     Patient Name: Genia Roldan        Date: 3/24/2021  : 1950   YES Patient  Verified  Visit #:     Insurance: Payor: Ike Rico / Plan: [place] MEDICARE PART A & B / Product Type: Medicare /      In time: 930 Out time: 1025   Total Treatment Time: 55     Medicare/BCBS Time Tracking (below)   Total Timed Codes (min):  45 1:1 Treatment Time:  45     TREATMENT AREA =  Pain in left thigh [M79.782]    SUBJECTIVE    Pain Level (on 0 to 10 scale):  0  / 10   Medication Changes/New allergies or changes in medical history, any new surgeries or procedures? NO    If yes, update Summary List   Subjective Functional Status/Changes:  []  No changes reported   No pain sitting, but pain always there in the morning. Intensity varies. L leg pain on occasion. Last occurrence 4 days ago.   Only lasted about 1 minute           OBJECTIVE  Modalities Rationale:     decrease pain and increase tissue extensibility to improve patient's ability to perform ADLs with less pain      min [] Estim, type/location:                                      []  att     []  unatt     []  w/US     []  w/ice    []  w/heat    min []  Mechanical Traction: type/lbs                   []  pro   []  sup   []  int   []  cont    []  before manual    []  after manual    min []  Ultrasound, settings/location:      min []  Iontophoresis w/ dexamethasone, location:                                               []  take home patch       []  in clinic   10 min []  Ice     [x]  Heat    location/position:     min []  Vasopneumatic Device, press/temp:     min []  Other:    [x] Skin assessment post-treatment (if applicable):    [x]  intact    []  redness- no adverse reaction     []redness  adverse reaction:      15 min Manual Therapy: Technique:      [x] S/DTM []IASTM []PROM [] Passive Stretching   [x]manual TPR  [] SOR [] man traction  []Jt manipulation:Gr I [] II []  III [] IV[]   [] OP with REIL    [] Treatment Area:  LB      *manual therapy interventions were performed at a separate and distinct time from   the therapeutic activities interventions. Rationale:      decrease pain, increase ROM, increase tissue extensibility and decrease trigger points to improve patient's ability to perform ADLs with less pain    30 min Therapeutic Exercise:  [x]  See flow sheet   Rationale:      increase ROM and increase strength to improve the patients ability to perform ADLs without pain         Billed With/As:   [] TE   [] TA   [] Neuro   [] Self Care Patient Education: [x] Review HEP    [] Progressed/Changed HEP based on:   [] positioning   [] body mechanics   [] transfers   [] heat/ice application    [] other:        Other Objective/Functional Measures:    Pelvic landmarks =  Inc tone R>L PVMs   Post Treatment Pain Level (on 0 to 10) scale:   0  / 10     ASSESSMENT  Assessment/Changes in Function:      Functional improvement: improved tolerance for sitting, went to [place] and back without difficulty  Functional limitation:  discomfort with transition sit to stand           Patient will continue to benefit from skilled PT services to modify and progress therapeutic interventions, address functional mobility deficits, address ROM deficits, address strength deficits, analyze and address soft tissue restrictions, analyze and cue movement patterns and analyze and modify body mechanics/ergonomics to attain remaining goals.    Progress toward goals / Updated goals:    Good Progress to    [] STG    [x] LTG  4 as shown by neutral pelvis for several consistent visits         []  See Progress Note/Recertification    PLAN    [x]  Upgrade activities as tolerated {YES) Continue plan of care   []  Discharge due to :    []  Other:      Therapist: Shyann Moncada PT    Date: 3/24/2021 Time: 9:32 AM     Future Appointments   Date Time Provider Leighton Flynn   3/26/2021  9:30 AM Norma Valdes PTA [facility]   3/29/2021 10:15 AM Jenny Lala [facility]   3/31/2021  9:30 AM Aileen Parada, PT [facility]   4/5/2021  9:30 AM Aileen Parada, PT [facility]   4/7/2021  9:30 AM Aileen Parada, PT [facility]

## 2021-03-26 ENCOUNTER — HOSPITAL ENCOUNTER (OUTPATIENT)
Dept: PHYSICAL THERAPY | Age: 71
Discharge: HOME | End: 2021-03-26
Payer: MEDICARE

## 2021-03-26 PROCEDURE — 97110 THERAPEUTIC EXERCISES: CPT

## 2021-03-26 PROCEDURE — 97140 MANUAL THERAPY 1/> REGIONS: CPT

## 2021-03-26 NOTE — PROGRESS NOTES
PHYSICAL THERAPY - DAILY TREATMENT NOTE     Patient Name: Gonsalo Puente        Date: 3/26/2021  : 1950   YES Patient  Verified  Visit #:     Insurance: Payor: Rodolfo Rothgrant / Plan: [place] MEDICARE PART A & B / Product Type: Medicare /      In time: 930 Out time: 10:25   Total Treatment Time: 55     Medicare/BCBS Time Tracking (below)   Total Timed Codes (min):  45 1:1 Treatment Time:  45     TREATMENT AREA =  Pain in left thigh [M79.652]    SUBJECTIVE    Pain Level (on 0 to 10 scale):  3  / 10   Medication Changes/New allergies or changes in medical history, any new surgeries or procedures? NO    If yes, update Summary List   Subjective Functional Status/Changes:  []  No changes reported       No L leg pain last 8-9 days. OBJECTIVE  Modalities Rationale:     decrease pain and increase tissue extensibility to improve patient's ability to perform ADLs with less pain      min [] Estim, type/location:                                      []  att     []  unatt     []  w/US     []  w/ice    []  w/heat    min []  Mechanical Traction: type/lbs                   []  pro   []  sup   []  int   []  cont    []  before manual    []  after manual    min []  Ultrasound, settings/location:      min []  Iontophoresis w/ dexamethasone, location:                                               []  take home patch       []  in clinic   10 min []  Ice     [x]  Heat    location/position: In [place] to T/S and L/S.    min []  Vasopneumatic Device, press/temp:     min []  Other:    [x] Skin assessment post-treatment (if applicable):    [x]  intact    []  redness- no adverse reaction     []redness  adverse reaction:      15 min Manual Therapy: Technique:      [x] S/DTM []IASTM []PROM [] Passive Stretching   [x]manual TPR  [] SOR [] man traction  Treatment Area:  LB  *manual therapy interventions were performed at a separate and distinct time from  the therapeutic activities interventions.    Rationale:      decrease pain, increase ROM, increase tissue extensibility and decrease trigger points to improve patient's ability to perform ADLs with less pain    30 min Therapeutic Exercise:  [x]  See flow sheet   Rationale:      increase ROM and increase strength to improve the patients ability to perform ADLs without pain     Billed With/As:   [] TE   [] TA   [] Neuro   [] Self Care Patient Education: [x] Review HEP    [] Progressed/Changed HEP based on:   [] positioning   [] body mechanics   [] transfers   [] heat/ice application    [] other:        Other Objective/Functional Measures:    Tight L>R mid-lower T/S. Post Treatment Pain Level (on 0 to 10) scale:   0  / 10     ASSESSMENT  Assessment/Changes in Function:        Functional Improvement: Less frequent leg pain  Functional Limitations: Pain worse in am, pain with sit-stand          Patient will continue to benefit from skilled PT services to modify and progress therapeutic interventions, address functional mobility deficits, address ROM deficits, address strength deficits, analyze and address soft tissue restrictions, analyze and cue movement patterns and analyze and modify body mechanics/ergonomics to attain remaining goals.    Progress toward goals / Updated goals:    Good Progress to    [] STG    [x] LTG  4 as shown by neutral pelvis for several consistent visits         []  See Progress Note/Recertification    PLAN    [x]  Upgrade activities as tolerated {YES) Continue plan of care   []  Discharge due to :    []  Other:      Therapist: Madison Alegria PTA    Date: 3/26/2021 Time: 9:32 AM     Future Appointments   Date Time Provider Leighton Flynn   3/26/2021  9:30 AM Flavio Vilchis PTA [facility]   3/29/2021 10:15 AM Flavio Vilchis PTA [facility]   3/31/2021  9:30 AM Aileen Karma, PT [facility]   4/5/2021  9:30 AM Aileen Karma, PT [facility]   4/7/2021  9:30 AM Aileen Karma, PT [facility]

## 2021-03-29 ENCOUNTER — HOSPITAL ENCOUNTER (OUTPATIENT)
Dept: PHYSICAL THERAPY | Age: 71
Discharge: HOME | End: 2021-03-29
Payer: MEDICARE

## 2021-03-29 PROCEDURE — 97110 THERAPEUTIC EXERCISES: CPT

## 2021-03-29 PROCEDURE — 97140 MANUAL THERAPY 1/> REGIONS: CPT

## 2021-03-29 NOTE — PROGRESS NOTES
PHYSICAL THERAPY - DAILY TREATMENT NOTE     Patient Name: Adrián Flowers        Date: 3/29/2021  : 1950   YES Patient  Verified  Visit #:   15      16  Insurance: Payor: Kellie Limon / Plan: [place] MEDICARE PART A & B / Product Type: Medicare /      In time: 10:15 Out time: 11:10   Total Treatment Time: 55     Medicare/BCBS Time Tracking (below)   Total Timed Codes (min):  45 1:1 Treatment Time:  45     TREATMENT AREA =  Pain in left thigh [M79.652]    SUBJECTIVE    Pain Level (on 0 to 10 scale):  3  / 10   Medication Changes/New allergies or changes in medical history, any new surgeries or procedures? NO    If yes, update Summary List   Subjective Functional Status/Changes:  []  No changes reported       No L leg pain last 10-12 days. \"Back is about the same. \"         OBJECTIVE  Modalities Rationale:     decrease pain and increase tissue extensibility to improve patient's ability to perform ADLs with less pain      min [] Estim, type/location:                                      []  att     []  unatt     []  w/US     []  w/ice    []  w/heat    min []  Mechanical Traction: type/lbs                   []  pro   []  sup   []  int   []  cont    []  before manual    []  after manual    min []  Ultrasound, settings/location:      min []  Iontophoresis w/ dexamethasone, location:                                               []  take home patch       []  in clinic   10 min [x]  Ice     []  Heat    location/position:  In [place] to T/S and L/S.    min []  Vasopneumatic Device, press/temp:     min []  Other:    [x] Skin assessment post-treatment (if applicable):    [x]  intact    []  redness- no adverse reaction     []redness  adverse reaction:      15 min Manual Therapy: Technique:      [x] S/DTM []IASTM []PROM [] Passive Stretching   [x]manual TPR  [] SOR [] man traction  Treatment Area:  LB  *manual therapy interventions were performed at a separate and distinct time from  the therapeutic activities interventions. Rationale:      decrease pain, increase ROM, increase tissue extensibility and decrease trigger points to improve patient's ability to perform ADLs with less pain    30 min Therapeutic Exercise:  [x]  See flow sheet   Rationale:      increase ROM and increase strength to improve the patients ability to perform ADLs without pain     Billed With/As:   [] TE   [] TA   [] Neuro   [] Self Care Patient Education: [x] Review HEP    [] Progressed/Changed HEP based on:   [] positioning   [] body mechanics   [] transfers   [] heat/ice application    [] other:        Other Objective/Functional Measures:    Tight L>R mid-lower T/S. Added clamshells; no adverse effects. Post Treatment Pain Level (on 0 to 10) scale:   0  / 10     ASSESSMENT  Assessment/Changes in Function:      Functional Improvement: No L leg pain nearly 2 weeks. Core progressions. Functional Limitations: Pain worse in am, intermittent pain with sit-stand          Patient will continue to benefit from skilled PT services to modify and progress therapeutic interventions, address functional mobility deficits, address ROM deficits, address strength deficits, analyze and address soft tissue restrictions, analyze and cue movement patterns and analyze and modify body mechanics/ergonomics to attain remaining goals. Progress toward goals / Updated goals:    Good Progress to    [] STG    [x] LTG  4 as shown by neutral pelvis for several consistent visits. Compliant with HEP.          []  See Progress Note/Recertification    PLAN    [x]  Upgrade activities as tolerated {YES) Continue plan of care   []  Discharge due to :    []  Other:      Therapist: Stephanie Smalls PTA    Date: 3/29/2021 Time: 9:32 AM     Future Appointments   Date Time Provider Leighton Flynn   3/29/2021 10:15 AM Daisy Plolock PTA [facility]   3/31/2021  9:30 AM Huong Persaud PT [facility]   4/5/2021  9:30 AM Huong Persaud PT [facility]   4/7/2021  9:30 AM Kvng Waller, PT [facility]

## 2021-03-31 ENCOUNTER — HOSPITAL ENCOUNTER (OUTPATIENT)
Dept: PHYSICAL THERAPY | Age: 71
Discharge: HOME | End: 2021-03-31
Payer: MEDICARE

## 2021-03-31 PROCEDURE — 97110 THERAPEUTIC EXERCISES: CPT

## 2021-03-31 PROCEDURE — 97140 MANUAL THERAPY 1/> REGIONS: CPT

## 2021-03-31 NOTE — PROGRESS NOTES
PHYSICAL THERAPY - DAILY TREATMENT NOTE     Patient Name: Merlin Molt        Date: 3/31/2021  : 1950   YES Patient  Verified  Visit #:   15   of   16  Insurance: Payor: Gerhardt Hinders / Plan: [place] MEDICARE PART A & B / Product Type: Medicare /      In time: 930 Out time: 5499   Total Treatment Time: 55     Medicare/BCBS Time Tracking (below)   Total Timed Codes (min):  45 1:1 Treatment Time:  45     TREATMENT AREA =  Pain in left thigh [M79.652]    SUBJECTIVE    Pain Level (on 0 to 10 scale):  1  / 10   Medication Changes/New allergies or changes in medical history, any new surgeries or procedures? NO    If yes, update Summary List   L LB/ SIJ region pain          OBJECTIVE  Modalities Rationale:     decrease pain and increase tissue extensibility to improve patient's ability to perform ADLs with less pain      min [] Estim, type/location:                                      []  att     []  unatt     []  w/US     []  w/ice    []  w/heat    min []  Mechanical Traction: type/lbs                   []  pro   []  sup   []  int   []  cont    []  before manual    []  after manual    min []  Ultrasound, settings/location:      min []  Iontophoresis w/ dexamethasone, location:                                               []  take home patch       []  in clinic   10 min []  Ice     [x]  Heat    location/position:     min []  Vasopneumatic Device, press/temp:     min []  Other:    [x] Skin assessment post-treatment (if applicable):    [x]  intact    []  redness- no adverse reaction     []redness  adverse reaction:      15 min Manual Therapy: Technique:      [x] S/DTM []IASTM []PROM [] Passive Stretching   [x]manual TPR  [] SOR [] man traction  [x]Jt manipulation:Gr I [] II []  III [x] IV[x] - sacral rock  [] OP with REIL    []   Treatment Area:  LB/ gluts      *manual therapy interventions were performed at a separate and distinct time from   the therapeutic activities interventions.    Rationale:      decrease pain, increase ROM, increase tissue extensibility and decrease trigger points to improve patient's ability to perform ADLs with less pain    30 min Therapeutic Exercise:  [x]  See flow sheet   Rationale:      increase ROM, increase strength and inc stability to improve the patients ability to perform ADLs with less pain         Billed With/As:   [x] TE   [] TA   [] Neuro   [] Self Care Patient Education: [x] Review HEP    [] Progressed/Changed HEP based on:   [] positioning   [] body mechanics   [] transfers   [] heat/ice application    [] other:        Other Objective/Functional Measures:    Neutral pelvis   Post Treatment Pain Level (on 0 to 10) scale:   0  / 10     ASSESSMENT  Assessment/Changes in Function:      Functional improvement:  neutral pelvis, no recent leg Sx  Functional limitation:  persistent low level pain           Patient will continue to benefit from skilled PT services to modify and progress therapeutic interventions, address functional mobility deficits, address ROM deficits, address strength deficits, analyze and address soft tissue restrictions, analyze and cue movement patterns and assess and modify postural abnormalities to attain remaining goals.    Progress toward goals / Updated goals:    Reaching plateau         []  See Progress Note/Recertification    PLAN    [x]  Upgrade activities as tolerated {YES) Continue plan of care   []  Discharge due to :    []  Other:      Therapist: Elba Lombard, PT    Date: 3/31/2021 Time: 9:33 AM     Future Appointments   Date Time Provider Leighton Flynn   4/5/2021  9:30 AM Huong Persaud PT [facility]   4/7/2021  8:00 AM Huong Persaud PT [facility]

## 2021-04-05 ENCOUNTER — HOSPITAL ENCOUNTER (OUTPATIENT)
Dept: PHYSICAL THERAPY | Age: 71
Discharge: HOME | End: 2021-04-05
Payer: MEDICARE

## 2021-04-05 PROCEDURE — 97110 THERAPEUTIC EXERCISES: CPT

## 2021-04-05 PROCEDURE — 97140 MANUAL THERAPY 1/> REGIONS: CPT

## 2021-04-05 NOTE — PROGRESS NOTES
PHYSICAL THERAPY - DAILY TREATMENT NOTE     Patient Name: Rachael Funes        Date: 2021  : 1950   YES Patient  Verified  Visit #:   15   of   16  Insurance: Payor: Erendira Marin / Plan: [place] MEDICARE PART A & B / Product Type: Medicare /      In time: 483 Out time: 79   Total Treatment Time: 50     Medicare/BCBS Time Tracking (below)   Total Timed Codes (min):  40 1:1 Treatment Time:  40     TREATMENT AREA =  Pain in left thigh [M79.352]    SUBJECTIVE    Pain Level (on 0 to 10 scale):  0  / 10   Medication Changes/New allergies or changes in medical history, any new surgeries or procedures? NO    If yes, update Summary List   Subjective Functional Status/Changes:  []  No changes reported   See DC           OBJECTIVE  Modalities Rationale:     decrease inflammation and decrease pain to improve patient's ability to perform ADLs with less pain      min [] Estim, type/location:                                      []  att     []  unatt     []  w/US     []  w/ice    []  w/heat    min []  Mechanical Traction: type/lbs                   []  pro   []  sup   []  int   []  cont    []  before manual    []  after manual    min []  Ultrasound, settings/location:      min []  Iontophoresis w/ dexamethasone, location:                                               []  take home patch       []  in clinic   10 min []  Ice     [x]  Heat    location/position:     min []  Vasopneumatic Device, press/temp:     min []  Other:    [x] Skin assessment post-treatment (if applicable):    [x]  intact    []  redness- no adverse reaction     []redness  adverse reaction:      10 min Manual Therapy: Technique:      [x] S/DTM []IASTM []PROM [] Passive Stretching   [x]manual TPR  [] SOR [] man traction  []Jt manipulation:Gr I [] II []  III [] IV[]   [] OP with REIL    []   Treatment Area: LB       *manual therapy interventions were performed at a separate and distinct time from   the therapeutic activities interventions. Rationale:      decrease pain, increase ROM, increase tissue extensibility and decrease trigger points to improve patient's ability to perform ADLs with less pain    30 min Therapeutic Exercise:  [x]  See flow sheet   Rationale:      increase ROM and increase strength to improve the patients ability to perform ADLs with less pain         Billed With/As:   [] TE   [] TA   [] Neuro   [] Self Care Patient Education: [x] Review HEP    [] Progressed/Changed HEP based on:   [] positioning   [] body mechanics   [] transfers   [] heat/ice application    [] other:        Other Objective/Functional Measures:    See DC   Post Treatment Pain Level (on 0 to 10) scale:   0  / 10     ASSESSMENT    [x]  See Progress Note/Recertification/ see DC    PLAN    []  Upgrade activities as tolerated {YES) Continue plan of care   [x]  Discharge due to :    []  Other:      Therapist: Jesus Murphy PT    Date: 4/5/2021 Time: 9:45 AM     Future Appointments   Date Time Provider Leighton Flynn   4/7/2021  8:00 AM Fe Paniagua PT [facility]

## 2021-04-05 NOTE — PROGRESS NOTES
[id number] PHYSICAL THERAPY AT [address] [facility], [address] [place] [address], [address], [address] [place] [address] - Phone: [phone]  Fax: [phone]   PHYSICAL THERAPY DISCHARGE                  Patient Name: Ben Pride : 1950   Treatment/Medical Diagnosis: Pain in left thigh [M79.652]   Onset Date: ongoing     Referral Source: Carrington Lezama MD [facility] [place], operated by [facility]): 21   Prior Hospitalization: See Medical History Provider #: [id number]   Prior Level of Function: Pain with ADLs   Comorbidities: Depression, arthritis, thyroid problem, asthma, gall baller surgery, toe surgery   Medications: Verified on Patient Summary List   Visits from [facility]: 15 Missed Visits: -       Previous Goals:  1 Patient to report >= 70% improvement in symptoms with ADLs. 2 Patient will be independent with finalized HEP/ self maintenance. 3 Increase FOTO score >=  66% to indicate improved function with use of LS. 4 Patient to maintain neutral pelvis x 2 week duration.                    Prior Level/Current Level:  1) Prior Level: 25%                 Current Level: 25%                 Goal Met? no  2) Prior Level: na                 Current Level: indep with current HEP                 Goal Met? yes  3) Prior Level: 63%                 Current Level: 66%                 Goal Met? yes  4) Prior Level: neutral pelvis last few visits (1 week)                 Current Level: neurtral pelvis > 2 weeks                 Goal Met? yes     Key Functional Changes/Progress: Patient reports 25% overall improvement, but states she has had no recent improvement. She reports decreased frequency and severity of episodes of pain since initiation of PT. She reports/ demonstrates improved ability to transition sit to stand and bending. Pain is primarily present in the morning and can vary through the day depending on activity.   She demonstrates/ reports plateau in progress      Assessments/Recommendations: discharge from PT due to lack of recent progress    If you have any questions/comments please contact us directly at [phone].    Thank you for allowing us to assist in the care of your patient.     Therapist Signature: Eugenia Sheridan PT Date: 0/0/9933   Certification Period:  Reporting Period: 3/17/21 to 6/14/21  3/17/21 to 4/5/21 Time: 9:40 AM

## 2021-04-07 ENCOUNTER — APPOINTMENT (OUTPATIENT)
Dept: PHYSICAL THERAPY | Age: 71
End: 2021-04-07
Payer: MEDICARE